# Patient Record
Sex: FEMALE
[De-identification: names, ages, dates, MRNs, and addresses within clinical notes are randomized per-mention and may not be internally consistent; named-entity substitution may affect disease eponyms.]

---

## 2019-04-02 ENCOUNTER — TRANSCRIPTION ENCOUNTER (OUTPATIENT)
Age: 24
End: 2019-04-02

## 2019-09-26 ENCOUNTER — EMERGENCY (EMERGENCY)
Facility: HOSPITAL | Age: 24
LOS: 1 days | Discharge: HOME | End: 2019-09-26
Attending: EMERGENCY MEDICINE | Admitting: EMERGENCY MEDICINE
Payer: MEDICAID

## 2019-09-26 VITALS
DIASTOLIC BLOOD PRESSURE: 73 MMHG | TEMPERATURE: 98 F | HEART RATE: 93 BPM | OXYGEN SATURATION: 100 % | RESPIRATION RATE: 18 BRPM | SYSTOLIC BLOOD PRESSURE: 128 MMHG

## 2019-09-26 DIAGNOSIS — R69 ILLNESS, UNSPECIFIED: ICD-10-CM

## 2019-09-26 DIAGNOSIS — F41.9 ANXIETY DISORDER, UNSPECIFIED: ICD-10-CM

## 2019-09-26 PROCEDURE — 99283 EMERGENCY DEPT VISIT LOW MDM: CPT

## 2019-09-26 PROCEDURE — 90792 PSYCH DIAG EVAL W/MED SRVCS: CPT

## 2019-09-26 NOTE — ED BEHAVIORAL HEALTH ASSESSMENT NOTE - HPI (INCLUDE ILLNESS QUALITY, SEVERITY, DURATION, TIMING, CONTEXT, MODIFYING FACTORS, ASSOCIATED SIGNS AND SYMPTOMS)
Patient is a 23 year old  non-caregiver  employed female currently residing in a private residence with Family. She has no history of inpatient admissions or suicide attempts. She has no history of aggression, violence, legal issues or substance abuse problems PMH includes 8 months pregnant- scheduled  . BIB mother self referred for anxiety. Patient is a 23 year old  non-caregiver  employed female currently residing in a private residence with Family. She has no history of inpatient admissions or suicide attempts. She has no history of aggression, violence, legal issues or substance abuse problems PMH includes 8 months pregnant- scheduled  . BIB mother self referred for anxiety.        Patient denies any hallucinations, denies thought insertion/withdrawal, denies referential thought processes & is not paranoid on interview. Pt is linear, logical, organized and well related. Patient does not report nor exhibit any signs of lacho, including irritable or elevated mood, grandiosity, pressured speech, risk-taking behaviors, increase in productivity or agitation. Patient denies anhedonia, preoccupation with death or feelings of guilt. Patient adamantly denies SI, intent or plan; denies any HI, violent thoughts.     See  note for collateral information. Patient is a 23 year old  non-caregiver  employed female currently residing in a private residence with Family. She has no history of inpatient admissions or suicide attempts. She has no history of aggression, violence, legal issues or substance abuse problems PMH includes 8 months pregnant- scheduled  . BIB mother self referred for anxiety.    Patient reports she came to the ER because she cannot get the thought that her baby is not her husbands out of her mind. She reports she purposefully became pregnant after trying with her  and now is due  (scheduled ). She stated 6 weeks ago she woke up and could not get the thought out of her mind that this is not her husbands baby. She reports prior to her  she was speaking with another man who she had a relationship with for many years. She has continued to speak with him but has not seen him. However she can't get the thought out of her mind that maybe it is his baby. She stated she tries to rationalize in her mind that this is untrue but she cannot recall a certain portion of one day near her conception date and so says it doesn't add up either way. She stated even when she convinces herself it is untrue the thought returns.     Patient reports she has been tearful and not her normal self, having poor sleep, decreased appetite and anxiety. She has been going to work and caring for herself/baby but missed the last 2 days of work. She has been attending all her appointments. Her  is aware of this situation. Patient denies any hallucinations, denies thought insertion/withdrawal, denies referential thought processes & is not paranoid on interview. Pt is linear, logical, organized and well related. Patient does not report nor exhibit any signs of lacho, including irritable or elevated mood, grandiosity, pressured speech, risk-taking behaviors, increase in productivity or agitation. Patient denies anhedonia, preoccupation with death or feelings of guilt. Patient adamantly denies SI, intent or plan; denies any HI, violent thoughts.     Patient is future oriented stating she named the baby Jessie and is looking forward to her. She adamantly denied suicidal ideation. When confronted with mother's passive si statement she reports in a state of being upset she said to her mother she isn't sure how she could live with herself if it wasn't her husbands baby but adamantly denied thoughts to harm herself, the baby or anyone else.     See  note for collateral information. Patient is a 23 year old  non-caregiver  employed female currently residing in a private residence with Family. She has no history of inpatient admissions or suicide attempts. She has no history of aggression, violence, legal issues or substance abuse problems PMH includes 8 months pregnant- scheduled  . BIB mother self referred for anxiety.    Patient reports she came to the ER because she cannot get the thought that her baby is not her husbands out of her mind. She reports she purposefully became pregnant after trying with her  and now is due  (scheduled ). She stated 6 weeks ago she woke up and could not get the thought out of her mind that this is not her husbands baby. She reports prior to her  she was speaking with another man who she had a  complicated relationship with. She has continued to speak with him but has not seen him. However she can't get the thought out of her mind that maybe it is his baby. She stated she tries to rationalize in her mind that this is untrue but she cannot recall a certain portion of one day near her conception date and so it doesn't add up either way. She stated even when she convinces herself it is untrue the thought returns.     Patient reports she has been tearful and not her normal self, having poor sleep, decreased appetite and anxiety. She has been going to work and caring for herself/baby but missed the last 2 days of work. She has been attending all her appointments. Her  is aware of this situation and is supportive. Patient denies any hallucinations, denies thought insertion/withdrawal, denies referential thought processes & is not paranoid on interview. Pt is linear, logical, organized and well related. Patient does not report nor exhibit any signs of lacho, including irritable or elevated mood, grandiosity, pressured speech, risk-taking behaviors, increase in productivity or agitation. Patient denies anhedonia, preoccupation with death or feelings of guilt. Patient adamantly denies SI, intent or plan; denies any HI, violent thoughts.     Patient is future oriented stating she is naming the baby Jessie and is looking forward to her. She adamantly denied suicidal ideation. When confronted with mother's statement she reports that  in a state of being upset she said to her mother she isn't sure how she could live with herself if it wasn't her husbands baby but adamantly denied thoughts to harm herself or the baby and adamantly denied suicidal thoughts/plan/intent.     See  note for collateral information.

## 2019-09-26 NOTE — ED PROVIDER NOTE - NSFOLLOWUPINSTRUCTIONS_ED_ALL_ED_FT
Follow up with prenatal psych (referral sheet given to you)  Return to ED for any worsening of symptoms.

## 2019-09-26 NOTE — ED BEHAVIORAL HEALTH ASSESSMENT NOTE - RISK ASSESSMENT
Patient does not present at imminent risk    Risk factors- anxiety with obsessional thought, Low Acute Suicide Risk Patient does not present at imminent risk to self as others as evidence by no suicidal thoughts/plan/intent, no homicidal thoughts, no lacho/hypomania, no AH/VH/IOR/paranoia, no drugs/substances, no history of inpatient admissions, no suicide attempts, supportive family, stable living situation, treatment seeking, future oriented, able to safety plan, no lacho/hypomania, no trauma, no legal issues, no aggression/violence and calm/cooperative throughout evaluation.     Risk factors- anxiety with obsessional thought, pregnancy, no current outpatient treaters

## 2019-09-26 NOTE — ED BEHAVIORAL HEALTH ASSESSMENT NOTE - PATIENT'S CHIEF COMPLAINT
"About 6 weeks ago I went from being happy to waking up one day and thinking the baby isn't my husbands."

## 2019-09-26 NOTE — ED BEHAVIORAL HEALTH NOTE - BEHAVIORAL HEALTH NOTE
Spoke with Mother Monique Dodson- She reports patient was  11/18 to her  Seth (together x 6 years). Prior to Seth she was in an on/off relationship with a man named Anthony. She reports it was a "love hate relationship." She reports she was unaware her and her  were trying to get pregnant but when she told them in March she was pregnant she shared with her mother she was using calendar to track the days. Patient was happy and then at 20 weeks she woke up and suddenly began having an obsessive thought that this was not her husbands baby and it was possible that she had slept with Anthony and this was his baby. She reports the patient stated she could not remember if it happened. Patient had been talking to Anthony in February and mother stated the baby was measuring 1 week ahead at 12 weeks. They pieced together her days to find out that on a date that would have been a 1 week ahead conception date there was 1.5 hours she could not account for and had taken a phone call from Anthony. They spoke to Anthony and Anthony stated they did not sleep together however he is not a good historian. Mother reports patient cannot get this thought out of her head.     Patient has been crying, sleeping poorly and very anxious. Spoke with Mother Monique Dodson- She reports patient was  11/18 to her  Seth (together x 6 years). Prior to Seth she was in an on/off relationship with a man named Anthony. She reports it was a "love hate relationship." She reports she was unaware her and her  were trying to get pregnant but when she told them in March she was pregnant she shared with her mother she was using calendar to track the days. Patient was happy and then at 20 weeks she woke up and suddenly began having an obsessive thought that this was not her husbands baby and it was possible that she had slept with Anthony and this was his baby. She reports the patient stated she could not remember if it happened. Patient had been talking to Anthony in February and mother stated the baby was measuring 1 week ahead at 12 weeks. They pieced together her days to find out that on a date that would have been a 1 week ahead conception date there was 1.5 hours she could not account for and had taken a phone call from Anthony. They spoke to Anthony and Anthony stated they did not sleep together however he is not a good historian. Mother reports patient cannot get this thought out of her head. At 20 weeks she wanted an amniocentesis but her OB wouldn't do it.     Patient has been crying, sleeping poorly and anxious. Mother reports she is caring for self but not wearing as nice clothes as she usually does/doing her makeup. She has not gone to work the last 2 days. Mother denies patient experiencing or endorsing manic/hypomanic symptoms, paranoia, IOR, SI/HI/intent/plan, AH/VH or any other mental health issues. She has been attending her appointments. She reports patient did make passive si statement to her in the car a few days ago when crying with her stating if after the baby is born if it is not her husbands baby she doesn't know what she would do and how she could live at that point but she denied any suicidal thoughts/plan/intent to kill or harm herself in anyway. Mother stated the patient is not at imminent risk to harm herself. She has no concerns regarding suicidal ideation. She has no safety concerns. Spoke with Mother Monique Dodson- She reports patient was  11/18 to her  Seth (together x 6 years). Prior to Seth she was in an on/off relationship with a man named Anthony. She reports it was a "love hate relationship." She reports she was unaware her and her  were trying to get pregnant but when she told them in March she was pregnant she shared with her mother she was using calendar to track the days. Patient was happy and then at 20 weeks she woke up and suddenly began having an obsessive thought that this was not her husbands baby and it was possible that she had slept with Anthony and this was his baby. She reports the patient stated she could not remember if it happened. Patient had been talking to Anthony in February and mother stated the baby was measuring 1 week ahead at 12 weeks. They pieced together her days to find out that on a date that would have been a 1 week ahead conception date there was 1.5 hours she could not account for and had taken a phone call from Anthony. They spoke to Anthony and Anthony stated they did not sleep together however he is not a good historian. Mother reports patient cannot get this thought out of her head. At 20 weeks she wanted an amniocentesis but her OB wouldn't do it. She stated she is unsure if patient could have slept with Anthony.     Patient has been crying, sleeping poorly and anxious. Mother reports she is caring for self but not wearing as nice clothes as she usually does/doing her makeup. She has not gone to work the last 2 days. Mother denies patient experiencing or endorsing manic/hypomanic symptoms, paranoia, IOR, SI/HI/intent/plan, AH/VH or any other mental health issues. She has been attending her appointments. She reports patient did make passive si statement to her in the car a few days ago when crying with her stating if after the baby is born if it is not her husbands baby she doesn't know what she would do and how she could live at that point but she denied any suicidal thoughts/plan/intent to kill or harm herself in anyway. Mother stated the patient is not at imminent risk to harm herself. She has no concerns regarding suicidal ideation. She has no safety concerns. Spoke with Mother Monique Dodson- She reports patient was  11/18 to her  Seth (together x 6 years). Prior to Seth she was in an on/off relationship with a man named Anthony. She reports it was a "love hate relationship." She reports she was unaware her and her  were trying to get pregnant but when she told them in March she was pregnant patient shared with her mother she was using calendar to track the days. Patient was happy and then at 20 weeks she woke up and suddenly began having an obsessive thought that this was not her husbands baby and it was possible that she had slept with Anthony and this was Anthony's baby. She reports the patient stated she could not remember if sleeping with Anthony had happened. Patient had been talking to Anthony in February and mother stated the baby was measuring 1 week ahead at 12 weeks. They pieced together her days to find out that on a date that could have been the conception date (if baby was 1 week ahead) there was 1.5 hours she could not account for and had taken a phone call from Anthony prior to that. They spoke to Anthony and Anthony stated they did not sleep together however he is not a good historian. Her  is aware and also states he does not think patient was with Anthony but is supportive of patient and child even if it is not his. At 20 weeks she wanted an amniocentesis but her OB wouldn't do it. Mother stated she is unsure if patient could have slept with Anthony reporting there is a 50% chance.      Patient has been crying, sleeping poorly and anxious. Mother reports she is caring for self but not wearing as nice clothes as she usually does/doing her makeup. She has not gone to work the last 2 days. Mother denies patient experiencing or endorsing manic/hypomanic symptoms, paranoia, IOR, SI/HI/intent/plan, AH/VH or any other mental health issues. She has been attending her appointments. She reports patient did make a statement to her in the car a few days ago when crying with her stating if after the baby is born if it is not her husbands baby she doesn't know what she would do and how she could live at that point but she denied any suicidal thoughts/plan/intent to kill or harm herself or anyone else in anyway. Mother stated the patient is not at imminent risk to harm herself. She has no concerns regarding suicidal ideation. She has no safety concerns.

## 2019-09-26 NOTE — ED ADULT NURSE NOTE - OBJECTIVE STATEMENT
Received pt in  pt verbalizing she is 8 months pregnant denies any ob/gyn issues pt c/o depression no c/o s/i h/i/avh presently, emotional reassurance provided  safety & comfort measures maintained eval on going.

## 2019-09-26 NOTE — ED BEHAVIORAL HEALTH ASSESSMENT NOTE - SUMMARY
Patient is a 23 year old  non-caregiver  employed female currently residing in a private residence with Family. She has no history of inpatient admissions or suicide attempts. She has no history of aggression, violence, legal issues or substance abuse problems PMH includes 8 months pregnant- scheduled  . BIB mother self referred for anxiety. Patient is a 23 year old  non-caregiver  employed female currently residing in a private residence with Family. She has no history of inpatient admissions or suicide attempts. She has no history of aggression, violence, legal issues or substance abuse problems PMH includes 8 months pregnant- scheduled  . BIB mother self referred for anxiety.    Patient presents to the ER in the context of anxiety. Patient endorses obsessional thoughts regarding the paternity of her child. Although thoughts are somewhat based in reality based on past relationships she struggles to find cessation of thoughts despite attempting to rationalize resulting in tearfulness, trouble sleeping and anxiety. She adamantly denies SI/HI/SIB/intent/plan. She is not acutely psychotic, manic or hypomanic. Mother has no safety concerns. Patient is future oriented, able to safety plan and treatment seeking. Patient is not seeking and refused inpatient admission. She does not meet criteria for involuntary inpatient admission. Will discharge and referred to [facility] tomorrow 19 and  Appt 10/8/19. Extensive safety planning performed with patient and family. In addition to extensive discussion of safe places patient can go to, distraction techniques, coping skills, and who patient can call in the event of crisis including various hotlines. Patient and family agreeing verbally to return patient to ER or call 911 if symptoms worsen or patient has urges to harm self or others. Patient is a 23 year old  non-caregiver  employed female currently residing in a private residence with Family. She has no history of inpatient admissions or suicide attempts. She has no history of aggression, violence, legal issues or substance abuse problems PMH includes 8 months pregnant- scheduled  . BIB mother self referred for anxiety.    Patient presents to the ER in the context of anxiety. Patient endorses obsessional thoughts regarding the paternity of her child. Although thoughts are somewhat based in reality based on past relationships she struggles to find cessation of thoughts despite attempting to rationalize resulting in tearfulness, trouble sleeping and anxiety. She adamantly denies SI/HI/SIB/intent/plan. She is not acutely psychotic, manic or hypomanic. Mother has no safety concerns. Patient is future oriented, able to safety plan and treatment seeking. Patient is not seeking and refused inpatient admission. She does not meet criteria for involuntary inpatient admission. Will discharge and referred to [facility] tomorrow 19 and  Appt 10/8/19. Extensive safety planning performed with patient and family. In addition to extensive discussion of safe places patient can go to, distraction techniques, coping skills, and who patient can call in the event of crisis including various hotlines. Patient and family agreeing verbally to return patient to ER or call 911 if symptoms worsen or patient has urges to harm self or others.

## 2019-09-26 NOTE — ED BEHAVIORAL HEALTH ASSESSMENT NOTE - CASE SUMMARY
Patient is a 23 year old  non-caregiver  employed female currently residing in a private residence with Family. She has no history of inpatient admissions or suicide attempts. She has no history of aggression, violence, legal issues or substance abuse problems PMH includes 8 months pregnant- scheduled  . BIB mother self referred for anxiety.    Patient denies SI/HI/I/P as well as lacho and psychosis. She has been reporting increasing anxiety, ruminating on thoughts that her baby may not be her 's. It has been increasing over time. She is able to contract for safety. She was offered inpatient admission and declined. She does not meet criteria for involuntary admission and was given an urgent referral for the  clinic by social work and given the crisis clinic information to bridge care in between.

## 2019-09-26 NOTE — ED BEHAVIORAL HEALTH NOTE - BEHAVIORAL HEALTH NOTE
writer met with patient regarding follow up appointment.  Patient was receptive to referral to Prenatal clinic at  [facility].  pt states she can be reached at  where messages can be left with an appointment.  Writer emailed [facility] requesting appointment.

## 2019-09-26 NOTE — ED BEHAVIORAL HEALTH ASSESSMENT NOTE - SUICIDE PROTECTIVE FACTORS
Supportive social network of family or friends/Engaged in work or school/Other/Identifies reasons for living/Has future plans/Responsibility to family and others/Fear of death or the actual act of killing self/Frustration tolerance Identifies reasons for living/Has future plans/Supportive social network of family or friends/Other/Responsibility to family and others/Engaged in work or school/Ability to cope with stress/Frustration tolerance/Fear of death or the actual act of killing self

## 2019-09-26 NOTE — ED ADULT TRIAGE NOTE - CHIEF COMPLAINT QUOTE
Pt s 8 months pregnant reports x 4 months of having depression and ruminating thoughts that bevy is not her husbands . Pt denies drug /alcohol use . Pt denies SI

## 2019-09-26 NOTE — ED PROVIDER NOTE - PATIENT PORTAL LINK FT
You can access the FollowMyHealth Patient Portal offered by [facility] by registering at the following website: [email or web address]. By joining [email or web address]’s FollowMyHealth portal, you will also be able to view your health information using other applications (apps) compatible with our system.

## 2019-09-26 NOTE — ED ADULT NURSE REASSESSMENT NOTE - NS ED NURSE REASSESS COMMENT FT1
Comments: Evaluated and cleared by psychiatry, MC by MD.  Pt remains awake, calm at baseline mental status. pt denies s/i h/i/avh,  presently pt d/c  by MD resources & instructions provided to pt verbalizing understanding.

## 2019-09-26 NOTE — ED PROVIDER NOTE - OBJECTIVE STATEMENT
23 yr old female at 8 months pregnant by US presents with following hx; states 6 weeks ago awoke and felt panic that baby was not her husbands.  Asked if this possibly true states it might be.  States has told  about concerns.  Denies SI/HI.

## 2019-09-26 NOTE — ED BEHAVIORAL HEALTH ASSESSMENT NOTE - OTHER PAST PSYCHIATRIC HISTORY (INCLUDE DETAILS REGARDING ONSET, COURSE OF ILLNESS, INPATIENT/OUTPATIENT TREATMENT)
No PPH. No history of inpatient admissions or suicide attempts. Saw therapist 1x in Elementary school when parents got .

## 2019-09-26 NOTE — ED BEHAVIORAL HEALTH ASSESSMENT NOTE - DESCRIPTION
During course of ED visit patient was calm and cooperative. Patient was not aggressive or violent and did not require PRN medications.    Vital Signs Last 24 Hrs  T(C): 36.8 (26 Sep 2019 10:19), Max: 36.8 (26 Sep 2019 10:19)  T(F): 98.2 (26 Sep 2019 10:19), Max: 98.2 (26 Sep 2019 10:19)  HR: 93 (26 Sep 2019 10:19) (93 - 93)  BP: 128/73 (26 Sep 2019 10:19) (128/73 - 128/73)  BP(mean): --  RR: 18 (26 Sep 2019 10:19) (18 - 18)  SpO2: 100% (26 Sep 2019 10:19) (100% - 100%) 8 months Pregnant see hpi

## 2019-09-26 NOTE — ED BEHAVIORAL HEALTH ASSESSMENT NOTE - SUICIDE RISK FACTORS
Current mood episode/Agitation/Severe Anxiety/Panic/Recent onset of current/past psychiatric diagnosis/Insomnia

## 2019-09-26 NOTE — ED BEHAVIORAL HEALTH NOTE - BEHAVIORAL HEALTH NOTE
Writer provided patient written information for appointment at  clinic 10/8 open access between 8:30 -10am being the walk in time.  Pt instructed to walk in to crisis clinic sooner if necessary.

## 2019-09-26 NOTE — ED BEHAVIORAL HEALTH ASSESSMENT NOTE - SAFETY PLAN ADDT'L DETAILS
Safety plan discussed with.../Education provided regarding environmental safety / lethal means restriction/Provision of National Suicide Prevention Lifeline 8-178-249-MAGX (8627)

## 2019-09-27 ENCOUNTER — OUTPATIENT (OUTPATIENT)
Dept: OUTPATIENT SERVICES | Facility: HOSPITAL | Age: 24
LOS: 1 days | Discharge: STILL ADMITTED OR ADMINISTRATIVE | End: 2019-09-27

## 2019-09-27 PROBLEM — Z78.9 OTHER SPECIFIED HEALTH STATUS: Chronic | Status: ACTIVE | Noted: 2019-09-26

## 2019-09-27 NOTE — ED BEHAVIORAL HEALTH NOTE - BEHAVIORAL HEALTH NOTE
High Risk Log Follow-up:   Writer made call to patient .  Writer left voicemail requesting callback to social work phone.

## 2019-09-30 DIAGNOSIS — F42.9 OBSESSIVE-COMPULSIVE DISORDER, UNSPECIFIED: ICD-10-CM

## 2019-10-08 ENCOUNTER — OUTPATIENT (OUTPATIENT)
Dept: OUTPATIENT SERVICES | Facility: HOSPITAL | Age: 24
LOS: 1 days | Discharge: HOME | End: 2019-10-08

## 2019-10-31 DIAGNOSIS — F42.9 OBSESSIVE-COMPULSIVE DISORDER, UNSPECIFIED: ICD-10-CM

## 2019-11-10 ENCOUNTER — OUTPATIENT (OUTPATIENT)
Dept: OUTPATIENT SERVICES | Facility: HOSPITAL | Age: 24
LOS: 1 days | End: 2019-11-10
Payer: MEDICAID

## 2019-11-10 DIAGNOSIS — Z87.42 PERSONAL HISTORY OF OTHER DISEASES OF THE FEMALE GENITAL TRACT: ICD-10-CM

## 2019-11-10 PROCEDURE — 36415 COLL VENOUS BLD VENIPUNCTURE: CPT

## 2019-11-10 PROCEDURE — 86850 RBC ANTIBODY SCREEN: CPT

## 2019-11-10 PROCEDURE — 86901 BLOOD TYPING SEROLOGIC RH(D): CPT

## 2019-11-10 PROCEDURE — 86900 BLOOD TYPING SEROLOGIC ABO: CPT

## 2019-11-10 PROCEDURE — 85025 COMPLETE CBC W/AUTO DIFF WBC: CPT

## 2019-11-11 ENCOUNTER — INPATIENT (INPATIENT)
Facility: HOSPITAL | Age: 24
LOS: 1 days | Discharge: HOME | DRG: 540 | End: 2019-11-13
Attending: OBSTETRICS & GYNECOLOGY | Admitting: OBSTETRICS & GYNECOLOGY
Payer: MEDICAID

## 2019-11-11 VITALS — HEIGHT: 61 IN | WEIGHT: 200.62 LBS

## 2019-11-11 DIAGNOSIS — Z87.42 PERSONAL HISTORY OF OTHER DISEASES OF THE FEMALE GENITAL TRACT: ICD-10-CM

## 2019-11-11 LAB — T PALLIDUM AB TITR SER: NEGATIVE — SIGNIFICANT CHANGE UP

## 2019-11-11 PROCEDURE — 94760 N-INVAS EAR/PLS OXIMETRY 1: CPT

## 2019-11-11 PROCEDURE — 59050 FETAL MONITOR W/REPORT: CPT

## 2019-11-11 PROCEDURE — 85027 COMPLETE CBC AUTOMATED: CPT

## 2019-11-11 PROCEDURE — 36415 COLL VENOUS BLD VENIPUNCTURE: CPT

## 2019-11-11 PROCEDURE — 86780 TREPONEMA PALLIDUM: CPT

## 2019-11-11 PROCEDURE — 59514 CESAREAN DELIVERY ONLY: CPT | Mod: 80,U9

## 2019-11-11 RX ORDER — SODIUM CHLORIDE 9 MG/ML
1000 INJECTION, SOLUTION INTRAVENOUS
Refills: 0 | Status: DISCONTINUED | OUTPATIENT
Start: 2019-11-11 | End: 2019-11-11

## 2019-11-11 RX ORDER — SERTRALINE 25 MG/1
75 TABLET, FILM COATED ORAL DAILY
Refills: 0 | Status: DISCONTINUED | OUTPATIENT
Start: 2019-11-11 | End: 2019-11-13

## 2019-11-11 RX ORDER — OXYCODONE HYDROCHLORIDE 5 MG/1
5 TABLET ORAL ONCE
Refills: 0 | Status: DISCONTINUED | OUTPATIENT
Start: 2019-11-11 | End: 2019-11-13

## 2019-11-11 RX ORDER — OXYCODONE HYDROCHLORIDE 5 MG/1
5 TABLET ORAL ONCE
Refills: 0 | Status: DISCONTINUED | OUTPATIENT
Start: 2019-11-11 | End: 2019-11-12

## 2019-11-11 RX ORDER — SODIUM CHLORIDE 9 MG/ML
1000 INJECTION, SOLUTION INTRAVENOUS
Refills: 0 | Status: DISCONTINUED | OUTPATIENT
Start: 2019-11-11 | End: 2019-11-13

## 2019-11-11 RX ORDER — OXYCODONE HYDROCHLORIDE 5 MG/1
5 TABLET ORAL
Refills: 0 | Status: COMPLETED | OUTPATIENT
Start: 2019-11-11 | End: 2019-11-18

## 2019-11-11 RX ORDER — IBUPROFEN 200 MG
600 TABLET ORAL EVERY 6 HOURS
Refills: 0 | Status: COMPLETED | OUTPATIENT
Start: 2019-11-11 | End: 2020-10-09

## 2019-11-11 RX ORDER — ENOXAPARIN SODIUM 100 MG/ML
40 INJECTION SUBCUTANEOUS DAILY
Refills: 0 | Status: DISCONTINUED | OUTPATIENT
Start: 2019-11-11 | End: 2019-11-13

## 2019-11-11 RX ORDER — ACETAMINOPHEN 500 MG
975 TABLET ORAL
Refills: 0 | Status: DISCONTINUED | OUTPATIENT
Start: 2019-11-11 | End: 2019-11-13

## 2019-11-11 RX ORDER — CITRIC ACID/SODIUM CITRATE 300-500 MG
30 SOLUTION, ORAL ORAL ONCE
Refills: 0 | Status: DISCONTINUED | OUTPATIENT
Start: 2019-11-11 | End: 2019-11-11

## 2019-11-11 RX ORDER — METOCLOPRAMIDE HCL 10 MG
10 TABLET ORAL ONCE
Refills: 0 | Status: DISCONTINUED | OUTPATIENT
Start: 2019-11-11 | End: 2019-11-11

## 2019-11-11 RX ORDER — OXYTOCIN 10 UNIT/ML
333.33 VIAL (ML) INJECTION
Qty: 20 | Refills: 0 | Status: DISCONTINUED | OUTPATIENT
Start: 2019-11-11 | End: 2019-11-13

## 2019-11-11 RX ORDER — SODIUM CHLORIDE 9 MG/ML
1000 INJECTION, SOLUTION INTRAVENOUS ONCE
Refills: 0 | Status: DISCONTINUED | OUTPATIENT
Start: 2019-11-11 | End: 2019-11-11

## 2019-11-11 RX ORDER — CEFAZOLIN SODIUM 1 G
2000 VIAL (EA) INJECTION ONCE
Refills: 0 | Status: COMPLETED | OUTPATIENT
Start: 2019-11-11 | End: 2019-11-11

## 2019-11-11 RX ORDER — FAMOTIDINE 10 MG/ML
20 INJECTION INTRAVENOUS ONCE
Refills: 0 | Status: COMPLETED | OUTPATIENT
Start: 2019-11-11 | End: 2019-11-11

## 2019-11-11 RX ORDER — MORPHINE SULFATE 50 MG/1
4 CAPSULE, EXTENDED RELEASE ORAL
Refills: 0 | Status: DISCONTINUED | OUTPATIENT
Start: 2019-11-11 | End: 2019-11-12

## 2019-11-11 RX ORDER — IBUPROFEN 200 MG
600 TABLET ORAL EVERY 6 HOURS
Refills: 0 | Status: DISCONTINUED | OUTPATIENT
Start: 2019-11-11 | End: 2019-11-13

## 2019-11-11 RX ADMIN — Medication 1000 MILLIUNIT(S)/MIN: at 09:43

## 2019-11-11 RX ADMIN — SODIUM CHLORIDE 125 MILLILITER(S): 9 INJECTION, SOLUTION INTRAVENOUS at 17:46

## 2019-11-11 RX ADMIN — Medication 600 MILLIGRAM(S): at 17:07

## 2019-11-11 RX ADMIN — ENOXAPARIN SODIUM 40 MILLIGRAM(S): 100 INJECTION SUBCUTANEOUS at 23:17

## 2019-11-11 RX ADMIN — Medication 600 MILLIGRAM(S): at 23:17

## 2019-11-11 RX ADMIN — MORPHINE SULFATE 4 MILLIGRAM(S): 50 CAPSULE, EXTENDED RELEASE ORAL at 11:50

## 2019-11-11 RX ADMIN — Medication 100 MILLIGRAM(S): at 08:29

## 2019-11-11 RX ADMIN — MORPHINE SULFATE 4 MILLIGRAM(S): 50 CAPSULE, EXTENDED RELEASE ORAL at 12:04

## 2019-11-11 NOTE — PATIENT PROFILE OB - ALERT: PERTINENT HISTORY
Genital Herpes, last outbreak 2018 on Valtex. Anxiety, on Zoloft Genital Herpes, last outbreak 2018 on Valtex. Anxiety, on Zoloft/1st Trimester Sonogram/20 Week Level II Sonogram/Ultra Screen at 12 Weeks

## 2019-11-12 LAB
HCT VFR BLD CALC: 35.2 % — SIGNIFICANT CHANGE UP (ref 34.5–45)
HGB BLD-MCNC: 11.8 G/DL — SIGNIFICANT CHANGE UP (ref 11.5–15.5)
MCHC RBC-ENTMCNC: 31.4 PG — SIGNIFICANT CHANGE UP (ref 27–34)
MCHC RBC-ENTMCNC: 33.5 GM/DL — SIGNIFICANT CHANGE UP (ref 32–36)
MCV RBC AUTO: 93.6 FL — SIGNIFICANT CHANGE UP (ref 80–100)
PLATELET # BLD AUTO: 161 K/UL — SIGNIFICANT CHANGE UP (ref 150–400)
RBC # BLD: 3.76 M/UL — LOW (ref 3.8–5.2)
RBC # FLD: 13.2 % — SIGNIFICANT CHANGE UP (ref 10.3–14.5)
WBC # BLD: 12.87 K/UL — HIGH (ref 3.8–10.5)
WBC # FLD AUTO: 12.87 K/UL — HIGH (ref 3.8–10.5)

## 2019-11-12 RX ORDER — OXYCODONE HYDROCHLORIDE 5 MG/1
5 TABLET ORAL ONCE
Refills: 0 | Status: DISCONTINUED | OUTPATIENT
Start: 2019-11-12 | End: 2019-11-12

## 2019-11-12 RX ORDER — OXYCODONE HYDROCHLORIDE 5 MG/1
5 TABLET ORAL
Refills: 0 | Status: DISCONTINUED | OUTPATIENT
Start: 2019-11-12 | End: 2019-11-13

## 2019-11-12 RX ORDER — OXYCODONE HYDROCHLORIDE 5 MG/1
5 TABLET ORAL ONCE
Refills: 0 | Status: DISCONTINUED | OUTPATIENT
Start: 2019-11-12 | End: 2019-11-13

## 2019-11-12 RX ADMIN — OXYCODONE HYDROCHLORIDE 5 MILLIGRAM(S): 5 TABLET ORAL at 06:30

## 2019-11-12 RX ADMIN — Medication 600 MILLIGRAM(S): at 12:36

## 2019-11-12 RX ADMIN — Medication 975 MILLIGRAM(S): at 22:18

## 2019-11-12 RX ADMIN — Medication 600 MILLIGRAM(S): at 13:00

## 2019-11-12 RX ADMIN — Medication 975 MILLIGRAM(S): at 21:48

## 2019-11-12 RX ADMIN — Medication 600 MILLIGRAM(S): at 06:30

## 2019-11-12 RX ADMIN — OXYCODONE HYDROCHLORIDE 5 MILLIGRAM(S): 5 TABLET ORAL at 02:00

## 2019-11-12 RX ADMIN — Medication 975 MILLIGRAM(S): at 11:00

## 2019-11-12 RX ADMIN — Medication 975 MILLIGRAM(S): at 16:01

## 2019-11-12 RX ADMIN — Medication 975 MILLIGRAM(S): at 10:23

## 2019-11-12 RX ADMIN — Medication 975 MILLIGRAM(S): at 02:00

## 2019-11-12 RX ADMIN — Medication 600 MILLIGRAM(S): at 19:54

## 2019-11-12 RX ADMIN — Medication 975 MILLIGRAM(S): at 01:09

## 2019-11-12 RX ADMIN — Medication 600 MILLIGRAM(S): at 20:24

## 2019-11-12 RX ADMIN — ENOXAPARIN SODIUM 40 MILLIGRAM(S): 100 INJECTION SUBCUTANEOUS at 21:48

## 2019-11-12 RX ADMIN — SERTRALINE 75 MILLIGRAM(S): 25 TABLET, FILM COATED ORAL at 10:23

## 2019-11-12 RX ADMIN — Medication 600 MILLIGRAM(S): at 05:32

## 2019-11-12 RX ADMIN — OXYCODONE HYDROCHLORIDE 5 MILLIGRAM(S): 5 TABLET ORAL at 05:34

## 2019-11-12 RX ADMIN — Medication 600 MILLIGRAM(S): at 00:00

## 2019-11-12 RX ADMIN — OXYCODONE HYDROCHLORIDE 5 MILLIGRAM(S): 5 TABLET ORAL at 01:10

## 2019-11-12 NOTE — PROGRESS NOTE ADULT - SUBJECTIVE AND OBJECTIVE BOX
Postpartum Note,  Section  Patient is a 24y woman      Subjective: Patient seen and examined at bedside. No acute events overnight. Pain well controlled with current pain regimen. She is ambulating well and tolerating PO diet/fluids. She reports minimal bleeding/discharge at incision site. She is voiding well, but has not had flatus or a bm yet. Reports wanting to formula feed exclusively . Denies fever, headache, changes in vision, nausea, vomiting. Otherwise, patient feels well without additional complaints.     Physical exam:    Vital Signs Last 24 Hrs  T(C): 36.7 (2019 08:23), Max: 36.9 (2019 09:50)  T(F): 98.1 (2019 08:23), Max: 98.4 (2019 09:50)  HR: 77 (2019 08:23) (66 - 80)  BP: 112/74 (2019 08:23) (107/55 - 130/68)  BP(mean): --  RR: 16 (2019 08:23) (16 - 24)  SpO2: 99% (2019 08:23) (97% - 100%)    Gen: NAD  Cardio: S1,S2 no murmurs  Lungs: CTA B/L, no wheezing  Abdomen: Soft, nontender, no distension, firm uterine fundus at umbilicus.  Incision: Clean, dry, and intact  Ext: No calf tenderness bilaterally    LABS:                        11.8   12.87 )-----------( 161      ( 2019 07:42 )             35.2         Allergies    amoxicillin (Rash)    Intolerances      MEDICATIONS  (STANDING):  enoxaparin Injectable 40 milliGRAM(s) SubCutaneous daily  ibuprofen  Tablet. 600 milliGRAM(s) Oral every 6 hours  lactated ringers. 1000 milliLiter(s) (125 mL/Hr) IV Continuous <Continuous>  oxytocin Infusion 333.333 milliUNIT(s)/Min (1000 mL/Hr) IV Continuous <Continuous>  sertraline 75 milliGRAM(s) Oral daily    MEDICATIONS  (PRN):  acetaminophen   Tablet .. 975 milliGRAM(s) Oral <User Schedule> PRN Mild Pain (1 - 3), Moderate Pain (4 - 6)  morphine  - Injectable 4 milliGRAM(s) SubCutaneous every 10 minutes PRN Severe Pain (7 - 10)  oxyCODONE    IR 5 milliGRAM(s) Oral every 3 hours PRN Moderate to Severe Pain (4-10)  oxyCODONE    IR 5 milliGRAM(s) Oral once PRN Moderate to Severe Pain (4-10)        Assessment and Plan  -Patient is a 24y woman  s/p C/S POD#1   -Routine post-op care.  -Pain well controlled, continue current pain regimen.  -Encouraged ambulation.  -Encouraged PO diet/fluids.  -Dicussed breastfeeding.

## 2019-11-13 ENCOUNTER — TRANSCRIPTION ENCOUNTER (OUTPATIENT)
Age: 24
End: 2019-11-13

## 2019-11-13 VITALS
SYSTOLIC BLOOD PRESSURE: 116 MMHG | DIASTOLIC BLOOD PRESSURE: 62 MMHG | TEMPERATURE: 98 F | RESPIRATION RATE: 16 BRPM | OXYGEN SATURATION: 100 % | HEART RATE: 64 BPM

## 2019-11-13 RX ORDER — SERTRALINE 25 MG/1
3 TABLET, FILM COATED ORAL
Qty: 0 | Refills: 0 | DISCHARGE
Start: 2019-11-13

## 2019-11-13 RX ADMIN — Medication 975 MILLIGRAM(S): at 10:00

## 2019-11-13 RX ADMIN — Medication 975 MILLIGRAM(S): at 03:26

## 2019-11-13 RX ADMIN — Medication 975 MILLIGRAM(S): at 08:57

## 2019-11-13 RX ADMIN — Medication 600 MILLIGRAM(S): at 12:22

## 2019-11-13 RX ADMIN — SERTRALINE 75 MILLIGRAM(S): 25 TABLET, FILM COATED ORAL at 12:23

## 2019-11-13 RX ADMIN — Medication 600 MILLIGRAM(S): at 13:22

## 2019-11-13 RX ADMIN — Medication 600 MILLIGRAM(S): at 07:27

## 2019-11-13 RX ADMIN — Medication 975 MILLIGRAM(S): at 02:56

## 2019-11-13 NOTE — DISCHARGE NOTE OB - PATIENT PORTAL LINK FT
You can access the FollowMyHealth Patient Portal offered by [facility] by registering at the following website: [email or web address]. By joining [facility]’s FollowMyHealth portal, you will also be able to view your health information using other applications (apps) compatible with our system.

## 2019-11-13 NOTE — DISCHARGE NOTE OB - FUNCTIONAL SCREEN CURRENT LEVEL: BATHING, MLM
0 = independent Cheek Interpolation Flap Text: A decision was made to reconstruct the defect utilizing an interpolation axial flap and a staged reconstruction.  A telfa template was made of the defect.  This telfa template was then used to outline the Cheek Interpolation flap.  The donor area for the pedicle flap was then injected with anesthesia.  The flap was excised through the skin and subcutaneous tissue down to the layer of the underlying musculature.  The interpolation flap was carefully excised within this deep plane to maintain its blood supply.  The edges of the donor site were undermined.   The donor site was closed in a primary fashion.  The pedicle was then rotated into position and sutured.  Once the tube was sutured into place, adequate blood supply was confirmed with blanching and refill.  The pedicle was then wrapped with xeroform gauze and dressed appropriately with a telfa and gauze bandage to ensure continued blood supply and protect the attached pedicle.

## 2019-11-13 NOTE — DISCHARGE NOTE OB - MEDICATION SUMMARY - MEDICATIONS TO TAKE
I will START or STAY ON the medications listed below when I get home from the hospital:    sertraline 25 mg oral tablet  -- 3 tab(s) by mouth once a day  -- Indication: For Encounter for  delivery without indication

## 2019-11-13 NOTE — DISCHARGE NOTE OB - CARE PROVIDER_API CALL
David Ramires)  Obstetrics and Gynecology  [address] [place] [address]  Phone: [phone]  Fax: [phone]  Follow Up Time:

## 2019-11-13 NOTE — DISCHARGE NOTE OB - CARE PLAN
Principal Discharge DX:	 delivery delivered  Goal:	Return to normal activity  Assessment and plan of treatment:	Instructions given

## 2019-11-13 NOTE — PROGRESS NOTE ADULT - SUBJECTIVE AND OBJECTIVE BOX
Postpartum Note,  Section  She is a  24y woman who is now post-operative day: #2  Desires early discharge home    Subjective:  The patient feels well.  She is ambulating without difficulty.  She is tolerating PO.  She is voiding.  She denies nausea and vomiting.  Her pain is controlled.  She reports normal postpartum bleeding  She is breastfeeding.  She is formula feeding.    Physical exam:    Vital Signs Last 24 Hrs  T(C): 36.5 (2019 07:39), Max: 36.8 (2019 16:31)  T(F): 97.7 (2019 07:39), Max: 98.2 (2019 16:31)  HR: 64 (2019 07:39) (64 - 85)  BP: 116/62 (2019 07:39) (106/51 - 123/65)  BP(mean): --  RR: 16 (2019 07:39) (16 - 16)  SpO2: 100% (2019 07:39) (98% - 100%)    Gen: NAD  Breast: Soft, nontender, non engorged  Abdomen: Soft, nontender, no distension , firm uterine fundus at umbilicus.  Incision: Clean, dry, and intact with steri strips  Pelvic: Normal lochia noted  Ext: No calf tenderness    LABS:                        11.8   12.87 )-----------( 161      ( 2019 07:42 )             35.2     Allergies    amoxicillin (Rash)    Intolerances      MEDICATIONS  (STANDING):  enoxaparin Injectable 40 milliGRAM(s) SubCutaneous daily  ibuprofen  Tablet. 600 milliGRAM(s) Oral every 6 hours  lactated ringers. 1000 milliLiter(s) (125 mL/Hr) IV Continuous <Continuous>  oxytocin Infusion 333.333 milliUNIT(s)/Min (1000 mL/Hr) IV Continuous <Continuous>  sertraline 75 milliGRAM(s) Oral daily    MEDICATIONS  (PRN):  acetaminophen   Tablet .. 975 milliGRAM(s) Oral <User Schedule> PRN Mild Pain (1 - 3), Moderate Pain (4 - 6)  oxyCODONE    IR 5 milliGRAM(s) Oral every 3 hours PRN Moderate to Severe Pain (4-10)  oxyCODONE    IR 5 milliGRAM(s) Oral once PRN Moderate to Severe Pain (4-10)  oxyCODONE    IR 5 milliGRAM(s) Oral once PRN Moderate to Severe Pain (4-10)        Assessment and Plan  POD # 2 s/p C/S  Doing well.  Encourage ambulation.  Discharge home today.  Prescriptions for percocet and motrin electronically sent to the pharmacy.  F/U in 2 weeks for incision check.  Call for fevers, chills, nausea, vomiting, heavy vaginal bleeding, vaginal discharge, severe pain, symptoms of depression, problems with incision or any other concerning symptoms.  Nothing in vagina and no heavy lifting x 6 weeks.  No driving x 2 weeks.

## 2019-11-15 DIAGNOSIS — A60.00 HERPESVIRAL INFECTION OF UROGENITAL SYSTEM, UNSPECIFIED: ICD-10-CM

## 2019-11-15 DIAGNOSIS — Z88.0 ALLERGY STATUS TO PENICILLIN: ICD-10-CM

## 2019-11-15 DIAGNOSIS — Z3A.39 39 WEEKS GESTATION OF PREGNANCY: ICD-10-CM

## 2020-02-13 ENCOUNTER — TRANSCRIPTION ENCOUNTER (OUTPATIENT)
Age: 25
End: 2020-02-13

## 2020-05-01 ENCOUNTER — OUTPATIENT (OUTPATIENT)
Dept: OUTPATIENT SERVICES | Facility: HOSPITAL | Age: 25
LOS: 1 days | End: 2020-05-01
Payer: MEDICAID

## 2020-05-01 PROCEDURE — G9001: CPT

## 2020-05-04 DIAGNOSIS — Z71.89 OTHER SPECIFIED COUNSELING: ICD-10-CM

## 2021-01-01 NOTE — PATIENT PROFILE OB - FUNCTIONAL SCREEN CURRENT LEVEL: EATING, MLM
Patient is scheduled today per [facility]. Will inform of  screen results at appointment. Lab orders placed for bili   0 = independent

## 2022-07-25 NOTE — ED PROVIDER NOTE - CROS ED GI ALL NEG
COVID-19 PCR test completed. Patient handout For Patients Who Have Been Tested for Covid-19 (Coronavirus) was given to the patient, which includes test result notification process.     negative...

## 2023-10-09 DIAGNOSIS — Z12.4 ENCOUNTER FOR SCREENING FOR MALIGNANT NEOPLASM OF CERVIX: ICD-10-CM

## 2023-10-09 DIAGNOSIS — Z11.3 ENCOUNTER FOR SCREENING FOR INFECTIONS WITH A PREDOMINANTLY SEXUAL MODE OF TRANSMISSION: ICD-10-CM

## 2023-10-10 ENCOUNTER — APPOINTMENT (OUTPATIENT)
Dept: OBGYN | Facility: CLINIC | Age: 28
End: 2023-10-10
Payer: MEDICAID

## 2023-10-10 VITALS
WEIGHT: 160 LBS | HEIGHT: 64 IN | DIASTOLIC BLOOD PRESSURE: 64 MMHG | SYSTOLIC BLOOD PRESSURE: 108 MMHG | HEART RATE: 90 BPM | OXYGEN SATURATION: 98 % | BODY MASS INDEX: 27.31 KG/M2

## 2023-10-10 DIAGNOSIS — G43.829 MENSTRUAL MIGRAINE, NOT INTRACTABLE, W/OUT STATUS MIGRAINOSUS: ICD-10-CM

## 2023-10-10 DIAGNOSIS — Z00.00 ENCOUNTER FOR GENERAL ADULT MEDICAL EXAMINATION W/OUT ABNORMAL FINDINGS: ICD-10-CM

## 2023-10-10 DIAGNOSIS — Z13.31 ENCOUNTER FOR SCREENING FOR DEPRESSION: ICD-10-CM

## 2023-10-10 DIAGNOSIS — Z01.419 ENCOUNTER FOR GYNECOLOGICAL EXAMINATION (GENERAL) (ROUTINE) W/OUT ABNORMAL FINDINGS: ICD-10-CM

## 2023-10-10 DIAGNOSIS — Z12.39 ENCOUNTER FOR OTHER SCREENING FOR MALIGNANT NEOPLASM OF BREAST: ICD-10-CM

## 2023-10-10 DIAGNOSIS — Z86.19 PERSONAL HISTORY OF OTHER INFECTIOUS AND PARASITIC DISEASES: ICD-10-CM

## 2023-10-10 LAB
BILIRUB UR QL STRIP: NORMAL
CLARITY UR: CLEAR
COLLECTION METHOD: NORMAL
GLUCOSE UR-MCNC: NORMAL
HCG UR QL: 0.2 EU/DL
HCG UR QL: NEGATIVE
HGB UR QL STRIP.AUTO: NORMAL
KETONES UR-MCNC: NORMAL
LEUKOCYTE ESTERASE UR QL STRIP: NORMAL
NITRITE UR QL STRIP: NORMAL
PH UR STRIP: 6
PROT UR STRIP-MCNC: NORMAL
QUALITY CONTROL: YES
SP GR UR STRIP: 1.01

## 2023-10-10 PROCEDURE — 81025 URINE PREGNANCY TEST: CPT

## 2023-10-10 PROCEDURE — 81003 URINALYSIS AUTO W/O SCOPE: CPT | Mod: QW

## 2023-10-10 PROCEDURE — 99385 PREV VISIT NEW AGE 18-39: CPT | Mod: 25

## 2023-10-10 RX ORDER — SPIRONOLACTONE 50 MG/1
TABLET ORAL
Refills: 0 | Status: ACTIVE | COMMUNITY

## 2023-12-08 ENCOUNTER — APPOINTMENT (OUTPATIENT)
Dept: OBGYN | Facility: CLINIC | Age: 28
End: 2023-12-08
Payer: MEDICAID

## 2023-12-08 VITALS
BODY MASS INDEX: 29.88 KG/M2 | SYSTOLIC BLOOD PRESSURE: 112 MMHG | DIASTOLIC BLOOD PRESSURE: 76 MMHG | WEIGHT: 175 LBS | HEIGHT: 64 IN

## 2023-12-08 DIAGNOSIS — N92.0 EXCESSIVE AND FREQUENT MENSTRUATION WITH REGULAR CYCLE: ICD-10-CM

## 2023-12-08 DIAGNOSIS — R63.5 ABNORMAL WEIGHT GAIN: ICD-10-CM

## 2023-12-08 DIAGNOSIS — Z30.46 ENCOUNTER FOR SURVEILLANCE OF IMPLANTABLE SUBDERMAL CONTRACEPTIVE: ICD-10-CM

## 2023-12-08 DIAGNOSIS — Z83.3 FAMILY HISTORY OF DIABETES MELLITUS: ICD-10-CM

## 2023-12-08 PROCEDURE — 99202 OFFICE O/P NEW SF 15 MIN: CPT | Mod: 25

## 2023-12-08 PROCEDURE — 11982 REMOVE DRUG IMPLANT DEVICE: CPT

## 2023-12-11 LAB — TSH SERPL-ACNC: 1.09 UIU/ML

## 2023-12-18 ENCOUNTER — APPOINTMENT (OUTPATIENT)
Dept: PLASTIC SURGERY | Facility: CLINIC | Age: 28
End: 2023-12-18
Payer: MEDICAID

## 2023-12-18 VITALS
HEIGHT: 64 IN | DIASTOLIC BLOOD PRESSURE: 69 MMHG | BODY MASS INDEX: 27.31 KG/M2 | SYSTOLIC BLOOD PRESSURE: 108 MMHG | WEIGHT: 160 LBS | RESPIRATION RATE: 16 BRPM | HEART RATE: 71 BPM | OXYGEN SATURATION: 99 %

## 2023-12-18 DIAGNOSIS — N64.4 MASTODYNIA: ICD-10-CM

## 2023-12-18 DIAGNOSIS — Z98.890 OTHER SPECIFIED POSTPROCEDURAL STATES: ICD-10-CM

## 2023-12-18 PROCEDURE — 99203 OFFICE O/P NEW LOW 30 MIN: CPT

## 2023-12-31 NOTE — HISTORY OF PRESENT ILLNESS
[FreeTextEntry1] : Shante Dodson is a 28 year old female presenting to the office today complaining of persistent back pain. Patient underwent a bilateral breast reduction at age 15, but was unhappy with the amount of breast tissue removed. She later underwent bilateral breast augmentation at age 21. Since that time, patient reports she often awakes with back pain and feels "dragged down" by her augmented breasts. She complains of right breast pain and feels that the right breast implant has moved. Patient states she is unsure of the size of her implants. She is inquiring about removal of her breast implants   PMHx- denies    PSHx- bilateral breast reduction, bilateral breast augmentation,    Currently taking spironolactone  for acne vulgaris   Allergies- amoxicillin   Never smoker   Family history- breast cancer (maternal grandmother), cervical cancer (maternal grandmother, mother)   Work history-

## 2023-12-31 NOTE — CONSULT LETTER
[Dear  ___] : Dear  [unfilled], [Consult Letter:] : I had the pleasure of evaluating your patient, [unfilled]. [Please see my note below.] : Please see my note below. [Sincerely,] : Sincerely, [Consult Closing:] : Thank you very much for allowing me to participate in the care of this patient.  If you have any questions, please do not hesitate to contact me. [FreeTextEntry2] : Dr. David Ramirse [FreeTextEntry3] : Dr. Lauren Shikowitz-Behr, MD Board Certified Plastic and Reconstructive Surgeon [facility]  in Plastic Surgery, [facility]   [address] [place] [phone]

## 2023-12-31 NOTE — ADDENDUM
[FreeTextEntry1] : I, Johnson Tobar, documented this note as a scribe on behalf of Dr. Lauren Shikowitz-Behr, MD on 12/18/2023.

## 2023-12-31 NOTE — PHYSICAL EXAM
[NI] : Normal [de-identified] : NAD, AxOx3  [de-identified] : normal HR [de-identified] : nonlabored breathing  [de-identified] : RIGHT: SN-N 30, N-IMF 17, BW 14 LEFT: SN-N 30.5, N-IMF 15, BW 13 no masses, no nipple discharge nor retraction grade 3 ptosis bilaterally  implants soft no obvious capsular contracture [de-identified] : no edema  [de-identified] : normal affect  [de-identified] : grossly intact

## 2024-01-04 ENCOUNTER — APPOINTMENT (OUTPATIENT)
Dept: ULTRASOUND IMAGING | Facility: CLINIC | Age: 29
End: 2024-01-04

## 2024-01-12 NOTE — END OF VISIT
[FreeTextEntry3] : I, Jessica Moscoso solely acted as scribe for Dr. David Ramires on 10/10/2023  All medical entries made by the Scribe were at my, Dr. Ramires's, direction and personally dictated by me on 10/10/2023 . I have reviewed the chart and agree that the record accurately reflects my personal performance of the history, physical exam, assessment and plan. I have also personally directed, reviewed, and agreed with the chart.

## 2024-01-12 NOTE — PLAN
[FreeTextEntry1] : Yearly breast cancer screening with no current clinical or radiographic concerns. Plan continued yearly imaging and breast follow up, sooner as needed. I counseled the patient on current recommendations to reduce breast cancer risk including but not inclusive to regular exercise 20-30 minutes 3-4 times a week, low fat diet, limiting alcohol consumption, maintenance of ideal body weight, yearly imaging and self-breast awareness. Questions answered. I encouraged in light of Covid 19, social distancing, frequent hand washing and precautions to stay healthy. Few HSV outbreaks, not on prophylactic valtrex currently. Referral for a plastic surgeon given for evaluation of her breast implants.  The patient desires removal of her Implanon because of worsening side effects.  Info on IUD.  Has migraines with aura. I have spent 40 minutes of time on this encounter. Greater than 50% of the face-to-face encounter time was spent on counseling and/or coordination of care for examination findings, differential, testing, management and planning. Risks, benefits and alternatives of IUD contraception were discussed, including risks of infertility, abnormal bleeding, uterine perforation, infection, pregnancy, and ectopic pregnancy.   IUD is usually inserted during or just after a period and may be accompanied by pelvic pain and cramps. IUD string should be checked by the patient monthly and patient is to return after the first period following insertion for pelvic examination. Mirena IUD is effective for 8 years, Kyleena for up to five years, Myriam for three years and ParaGard IUD effective for 10 yrs.  All questions and concerns were discussed.

## 2024-01-12 NOTE — HISTORY OF PRESENT ILLNESS
[FreeTextEntry1] : pt is here for annual.  The patient presents today for a routine GYN exam. We reviewed together in detail her past medical and surgical histories, allergies and medication usage, social and family history. All questions were answered in easy to understand language.  She currently has the Nexplanon in place and at 1-and-a-[age]-year gurinder she had some side effects from the Nexplanon. States infrequently she has a menstrual cycle for two to three weeks. She was previously on OCPs but states she suffered from migraines with aura while taking these pills. Upon thorough questioning migraine with auro was only when nexplanon was in place and never any other time.  (Addended 2024-01-12)  B/L breast pain RT breast implant shifted caudal. Lower back pain also noted by patient.   She declines STI testing.  [PapSmeardate] : 03/23 [MensesLength] : 14 [PGxTotal] : 1 [[facility] HospitalxFulerm] : 1

## 2024-01-16 DIAGNOSIS — Z30.41 ENCOUNTER FOR SURVEILLANCE OF CONTRACEPTIVE PILLS: ICD-10-CM

## 2024-01-16 RX ORDER — DESOGESTREL AND ETHINYL ESTRADIOL 0.15-0.03
0.15-3 KIT ORAL DAILY
Qty: 3 | Refills: 0 | Status: ACTIVE | COMMUNITY
Start: 2024-01-16 | End: 1900-01-01

## 2024-05-21 NOTE — ED BEHAVIORAL HEALTH ASSESSMENT NOTE - LEGAL HISTORY

## 2025-06-06 NOTE — PATIENT PROFILE OB - FALL HARM RISK TYPE OF ASSESSMENT
Problem: At Risk for Falls  Goal: Patient does not fall  Outcome: Not met, plan adjusted  Goal: Patient takes action to control fall-related risks  Outcome: Not met, plan adjusted     Problem: At Risk for Injury Due to Fall  Goal: Patient does not fall  Outcome: Not met, plan adjusted  Goal: Takes action to control condition specific risks  Outcome: Not met, plan adjusted  Goal: Verbalizes understanding of fall-related injury personal risks  Description: Document education using the patient education activity  Outcome: Not met, plan adjusted     Problem: Fluid Volume Excess  Goal: Fluid Volume Excess Symptoms Resolved  Description: Treatment often consists of oxygen and respiratory support with diuretic therapy at doses that exceed usual dose (typically doubled).  Monitor patient response to treatment.  Acute dyspnea should resolve quickly if dose is adequate and kidney function is adequate. Dyspnea/SOB should only be observed with Activity after effective treatment. Patient should be able to lie down comfortably, without SOB.  Outcome: Not met, plan adjusted  Goal: Oxygenation is maintained (SpO2 greater than or equal to 90% or as ordered)  Outcome: Not met, plan adjusted     Problem: Impaired Physical Mobility  Goal: Functional status is maintained or returned to baseline during hospitalization  Outcome: Not met, plan adjusted  Goal: Tolerates activity for discharge setting with no abnormal symptoms  Outcome: Not met, plan adjusted     Problem: Skin Integrity Alteration  Goal: Skin remains intact with no new/deterioration of wound or pressure injury  Outcome: Not met, plan adjusted  Goal: Participates in wound care activities  Outcome: Not met, plan adjusted      Admission
